# Patient Record
Sex: MALE | Race: WHITE | NOT HISPANIC OR LATINO | Employment: FULL TIME | URBAN - METROPOLITAN AREA
[De-identification: names, ages, dates, MRNs, and addresses within clinical notes are randomized per-mention and may not be internally consistent; named-entity substitution may affect disease eponyms.]

---

## 2022-01-06 ENCOUNTER — OFFICE VISIT (OUTPATIENT)
Dept: FAMILY MEDICINE | Facility: CLINIC | Age: 32
End: 2022-01-06
Payer: COMMERCIAL

## 2022-01-06 VITALS
BODY MASS INDEX: 42.56 KG/M2 | SYSTOLIC BLOOD PRESSURE: 138 MMHG | RESPIRATION RATE: 18 BRPM | WEIGHT: 314.2 LBS | TEMPERATURE: 99.1 F | OXYGEN SATURATION: 96 % | HEART RATE: 110 BPM | DIASTOLIC BLOOD PRESSURE: 89 MMHG | HEIGHT: 72 IN

## 2022-01-06 DIAGNOSIS — Z00.00 HEALTH MAINTENANCE EXAMINATION: Primary | ICD-10-CM

## 2022-01-06 DIAGNOSIS — E66.01 MORBID OBESITY (H): ICD-10-CM

## 2022-01-06 DIAGNOSIS — N50.3 EPIDIDYMAL CYST: ICD-10-CM

## 2022-01-06 DIAGNOSIS — F41.9 ANXIETY: ICD-10-CM

## 2022-01-06 DIAGNOSIS — Z23 NEED FOR PROPHYLACTIC VACCINATION AND INOCULATION AGAINST INFLUENZA: ICD-10-CM

## 2022-01-06 PROCEDURE — 90686 IIV4 VACC NO PRSV 0.5 ML IM: CPT | Performed by: FAMILY MEDICINE

## 2022-01-06 PROCEDURE — 99213 OFFICE O/P EST LOW 20 MIN: CPT | Mod: 25 | Performed by: FAMILY MEDICINE

## 2022-01-06 PROCEDURE — 99385 PREV VISIT NEW AGE 18-39: CPT | Mod: 25 | Performed by: FAMILY MEDICINE

## 2022-01-06 PROCEDURE — 90471 IMMUNIZATION ADMIN: CPT | Performed by: FAMILY MEDICINE

## 2022-01-06 RX ORDER — ESCITALOPRAM OXALATE 10 MG/1
1 TABLET ORAL DAILY
COMMUNITY
Start: 2021-05-04 | End: 2022-01-06

## 2022-01-06 RX ORDER — ESCITALOPRAM OXALATE 10 MG/1
10 TABLET ORAL DAILY
Qty: 90 TABLET | Refills: 3 | Status: SHIPPED | OUTPATIENT
Start: 2022-01-06

## 2022-01-06 RX ORDER — CETIRIZINE HYDROCHLORIDE 10 MG/1
10 TABLET ORAL
COMMUNITY

## 2022-01-06 RX ORDER — MULTIVIT-MIN/FOLIC ACID/BIOTIN 400-400MCG
1 CAPSULE ORAL DAILY
COMMUNITY
End: 2022-07-06

## 2022-01-06 ASSESSMENT — ENCOUNTER SYMPTOMS
HEARTBURN: 0
JOINT SWELLING: 0
CONSTIPATION: 0
DIARRHEA: 0
HEADACHES: 0
COUGH: 0
CHILLS: 0
HEMATURIA: 0
FREQUENCY: 0
ABDOMINAL PAIN: 0
WEAKNESS: 0
SORE THROAT: 0
DIZZINESS: 0
FEVER: 0
EYE PAIN: 0
PALPITATIONS: 0
HEMATOCHEZIA: 0
MYALGIAS: 0
PARESTHESIAS: 0
NERVOUS/ANXIOUS: 0
DYSURIA: 0
NAUSEA: 0
ARTHRALGIAS: 0
SHORTNESS OF BREATH: 0

## 2022-01-06 ASSESSMENT — PAIN SCALES - GENERAL: PAINLEVEL: MODERATE PAIN (5)

## 2022-01-06 ASSESSMENT — MIFFLIN-ST. JEOR: SCORE: 2420.2

## 2022-01-06 NOTE — PROGRESS NOTES
SUBJECTIVE:   CC: Xiang Denson is an 31 year old male who presents for preventative health visit.       Patient has been advised of split billing requirements and indicates understanding: Yes  Healthy Habits:     Getting at least 3 servings of Calcium per day:  Yes    Bi-annual eye exam:  NO    Dental care twice a year:  NO    Sleep apnea or symptoms of sleep apnea:  Excessive snoring    Diet:  Regular (no restrictions)    Frequency of exercise:  2-3 days/week    Duration of exercise:  30-45 minutes    Taking medications regularly:  Yes    Medication side effects:  None    PHQ-2 Total Score: 2    Additional concerns today:  Yes      Today's PHQ-2 Score:   PHQ-2 ( 1999 Pfizer) 1/6/2022   Q1: Little interest or pleasure in doing things 1   Q2: Feeling down, depressed or hopeless 1   PHQ-2 Score 2   Q1: Little interest or pleasure in doing things Several days   Q2: Feeling down, depressed or hopeless Several days   PHQ-2 Score 2       Abuse: Current or Past(Physical, Sexual or Emotional)- No  Do you feel safe in your environment? No    Have you ever done Advance Care Planning? (For example, a Health Directive, POLST, or a discussion with a medical provider or your loved ones about your wishes): Yes, advance care planning is on file.    Social History     Tobacco Use     Smoking status: Not on file     Smokeless tobacco: Not on file   Substance Use Topics     Alcohol use: Not on file         Alcohol Use 1/6/2022   Prescreen: >3 drinks/day or >7 drinks/week? No       Last PSA: No results found for: PSA    Reviewed orders with patient. Reviewed health maintenance and updated orders accordingly - Yes  BP Readings from Last 3 Encounters:   01/06/22 (!) 148/98    Wt Readings from Last 3 Encounters:   01/06/22 142.5 kg (314 lb 3.2 oz)                  Patient Active Problem List   Diagnosis     Morbid obesity (H)     No past surgical history on file.    Social History     Tobacco Use     Smoking status: Former Smoker      "Smokeless tobacco: Former User   Substance Use Topics     Alcohol use: Yes     No family history on file.      Current Outpatient Medications   Medication Sig Dispense Refill     escitalopram (LEXAPRO) 10 MG tablet Take 1 tablet (10 mg) by mouth daily 90 tablet 3     cetirizine (ZYRTEC) 10 MG tablet Take 10 mg by mouth       Specialty Vitamins Products (VITAMINS FOR HAIR) CAPS Take 1 tablet by mouth daily       No Known Allergies  No lab results found.     Reviewed and updated as needed this visit by clinical staff                Reviewed and updated as needed this visit by Provider               No past medical history on file.   No past surgical history on file.    Review of Systems   Constitutional: Negative for chills and fever.   HENT: Negative for congestion, ear pain, hearing loss and sore throat.    Eyes: Negative for pain and visual disturbance.   Respiratory: Negative for cough and shortness of breath.    Cardiovascular: Positive for chest pain. Negative for palpitations and peripheral edema.   Gastrointestinal: Negative for abdominal pain, constipation, diarrhea, heartburn, hematochezia and nausea.   Genitourinary: Negative for dysuria, frequency, genital sores, hematuria, impotence, penile discharge and urgency.   Musculoskeletal: Negative for arthralgias, joint swelling and myalgias.   Skin: Negative for rash.   Neurological: Negative for dizziness, weakness, headaches and paresthesias.   Psychiatric/Behavioral: Negative for mood changes. The patient is not nervous/anxious.          OBJECTIVE:   /89   Pulse 110   Temp 99.1  F (37.3  C) (Tympanic)   Resp 18   Ht 1.824 m (5' 11.81\")   Wt 142.5 kg (314 lb 3.2 oz)   SpO2 96%   BMI 42.84 kg/m      Physical Exam  GENERAL: healthy, alert and no distress  EYES: Eyes grossly normal to inspection, PERRL and conjunctivae and sclerae normal  HENT: ear canals and TM's normal, nose and mouth without ulcers or lesions  NECK: no adenopathy, no asymmetry, " masses, or scars and thyroid normal to palpation  RESP: lungs clear to auscultation - no rales, rhonchi or wheezes  CV: regular rate and rhythm, normal S1 S2, no S3 or S4, no murmur, click or rub, no peripheral edema and peripheral pulses strong  ABDOMEN: soft, nontender, no hepatosplenomegaly, no masses and bowel sounds normal   (male): epididymal enlargement left, no hernias and penis normal without urethral discharge  MS: no gross musculoskeletal defects noted, no edema  SKIN: no suspicious lesions or rashes  NEURO: Normal strength and tone, mentation intact and speech normal  BACK: no CVA tenderness, no paralumbar tenderness  PSYCH: mentation appears normal, affect normal/bright  LYMPH: no cervical, supraclavicular, axillary, or inguinal adenopathy        ASSESSMENT/PLAN:   (Z00.00) Health maintenance examination  (primary encounter diagnosis)  Comment:   Plan: CBC with platelets, Comprehensive metabolic         panel (BMP + Alb, Alk Phos, ALT, AST, Total.         Bili, TP), Lipid panel reflex to direct LDL         Fasting            (Z23) Need for prophylactic vaccination and inoculation against influenza  Comment:   Plan: INFLUENZA VACCINE IM > 6 MONTHS VALENT IIV4         (AFLURIA/FLUZONE)            (E66.01) Morbid obesity (H)  Comment: encouraged him to continue working on life style modification including low fat/carb diet with regular exercise   Plan: mentioned above     (F41.9) Anxiety  Comment: will keep him on the current dose of meds, encouraged him to recheck with us in 1 year for follow up  Plan: escitalopram (LEXAPRO) 10 MG tablet            (N50.3) Epididymal cyst  Comment: palpable mass on left epididymis without particular sx, will have him to schedule US for further evaluation   Plan: US Testicular & Scrotum w [facility]              Patient has been advised of split billing requirements and indicates understanding: Yes  COUNSELING:   Reviewed preventive health counseling, as reflected in  "patient instructions    Estimated body mass index is 42.84 kg/m  as calculated from the following:    Height as of this encounter: 1.824 m (5' 11.81\").    Weight as of this encounter: 142.5 kg (314 lb 3.2 oz).     Weight management plan: Discussed healthy diet and exercise guidelines    He has no history on file for tobacco use.      Counseling Resources:  ATP IV Guidelines  Pooled Cohorts Equation Calculator  FRAX Risk Assessment  ICSI Preventive Guidelines  Dietary Guidelines for Americans, 2010  USDA's MyPlate  ASA Prophylaxis  Lung CA Screening    Aaron Sharpe MD  [facility]  "

## 2022-01-10 ENCOUNTER — HOSPITAL ENCOUNTER (OUTPATIENT)
Dept: ULTRASOUND IMAGING | Facility: CLINIC | Age: 32
Discharge: HOME | End: 2022-01-10
Attending: FAMILY MEDICINE | Admitting: FAMILY MEDICINE
Payer: COMMERCIAL

## 2022-01-10 DIAGNOSIS — N50.3 EPIDIDYMAL CYST: ICD-10-CM

## 2022-01-10 PROCEDURE — 76870 US EXAM SCROTUM: CPT

## 2022-01-20 ENCOUNTER — LAB (OUTPATIENT)
Dept: LAB | Facility: CLINIC | Age: 32
End: 2022-01-20
Payer: COMMERCIAL

## 2022-01-20 DIAGNOSIS — Z00.00 HEALTH MAINTENANCE EXAMINATION: ICD-10-CM

## 2022-01-20 LAB
ALBUMIN SERPL-MCNC: 3.7 G/DL (ref 3.4–5)
ALP SERPL-CCNC: 75 U/L (ref 40–150)
ALT SERPL W P-5'-P-CCNC: 60 U/L (ref 0–70)
ANION GAP SERPL CALCULATED.3IONS-SCNC: 6 MMOL/L (ref 3–14)
AST SERPL W P-5'-P-CCNC: 22 U/L (ref 0–45)
BILIRUB SERPL-MCNC: 0.4 MG/DL (ref 0.2–1.3)
BUN SERPL-MCNC: 13 MG/DL (ref 7–30)
CALCIUM SERPL-MCNC: 9.2 MG/DL (ref 8.5–10.1)
CHLORIDE BLD-SCNC: 106 MMOL/L (ref 94–109)
CHOLEST SERPL-MCNC: 176 MG/DL
CO2 SERPL-SCNC: 27 MMOL/L (ref 20–32)
CREAT SERPL-MCNC: 0.81 MG/DL (ref 0.66–1.25)
ERYTHROCYTE [DISTWIDTH] IN BLOOD BY AUTOMATED COUNT: 13.8 % (ref 10–15)
FASTING STATUS PATIENT QL REPORTED: YES
GFR SERPL CREATININE-BSD FRML MDRD: >90 ML/MIN/1.73M2
GLUCOSE BLD-MCNC: 106 MG/DL (ref 70–99)
HCT VFR BLD AUTO: 45.1 % (ref 40–53)
HDLC SERPL-MCNC: 34 MG/DL
HGB BLD-MCNC: 14.7 G/DL (ref 13.3–17.7)
LDLC SERPL CALC-MCNC: 118 MG/DL
MCH RBC QN AUTO: 30 PG (ref 26.5–33)
MCHC RBC AUTO-ENTMCNC: 32.6 G/DL (ref 31.5–36.5)
MCV RBC AUTO: 92 FL (ref 78–100)
NONHDLC SERPL-MCNC: 142 MG/DL
PLATELET # BLD AUTO: 341 10E3/UL (ref 150–450)
POTASSIUM BLD-SCNC: 4.2 MMOL/L (ref 3.4–5.3)
PROT SERPL-MCNC: 8.3 G/DL (ref 6.8–8.8)
RBC # BLD AUTO: 4.9 10E6/UL (ref 4.4–5.9)
SODIUM SERPL-SCNC: 139 MMOL/L (ref 133–144)
TRIGL SERPL-MCNC: 119 MG/DL
WBC # BLD AUTO: 6.9 10E3/UL (ref 4–11)

## 2022-01-20 PROCEDURE — 80061 LIPID PANEL: CPT

## 2022-01-20 PROCEDURE — 36415 COLL VENOUS BLD VENIPUNCTURE: CPT

## 2022-01-20 PROCEDURE — 80053 COMPREHEN METABOLIC PANEL: CPT

## 2022-01-20 PROCEDURE — 85027 COMPLETE CBC AUTOMATED: CPT

## 2022-06-30 ENCOUNTER — NURSE TRIAGE (OUTPATIENT)
Dept: NURSING | Facility: CLINIC | Age: 32
End: 2022-06-30

## 2022-06-30 ENCOUNTER — OFFICE VISIT (OUTPATIENT)
Dept: FAMILY MEDICINE | Facility: CLINIC | Age: 32
End: 2022-06-30
Payer: COMMERCIAL

## 2022-06-30 VITALS
BODY MASS INDEX: 41.86 KG/M2 | SYSTOLIC BLOOD PRESSURE: 125 MMHG | TEMPERATURE: 97.9 F | RESPIRATION RATE: 18 BRPM | HEART RATE: 95 BPM | WEIGHT: 307 LBS | OXYGEN SATURATION: 96 % | DIASTOLIC BLOOD PRESSURE: 86 MMHG

## 2022-06-30 DIAGNOSIS — J02.9 SORE THROAT: Primary | ICD-10-CM

## 2022-06-30 LAB
DEPRECATED S PYO AG THROAT QL EIA: NEGATIVE
GROUP A STREP BY PCR: NOT DETECTED

## 2022-06-30 PROCEDURE — 99213 OFFICE O/P EST LOW 20 MIN: CPT | Performed by: FAMILY MEDICINE

## 2022-06-30 PROCEDURE — 87651 STREP A DNA AMP PROBE: CPT | Performed by: FAMILY MEDICINE

## 2022-06-30 PROCEDURE — U0005 INFEC AGEN DETEC AMPLI PROBE: HCPCS | Performed by: FAMILY MEDICINE

## 2022-06-30 PROCEDURE — U0003 INFECTIOUS AGENT DETECTION BY NUCLEIC ACID (DNA OR RNA); SEVERE ACUTE RESPIRATORY SYNDROME CORONAVIRUS 2 (SARS-COV-2) (CORONAVIRUS DISEASE [COVID-19]), AMPLIFIED PROBE TECHNIQUE, MAKING USE OF HIGH THROUGHPUT TECHNOLOGIES AS DESCRIBED BY CMS-2020-01-R: HCPCS | Performed by: FAMILY MEDICINE

## 2022-06-30 RX ORDER — DEXAMETHASONE 2 MG/1
10 TABLET ORAL ONCE
Qty: 5 TABLET | Refills: 0 | Status: SHIPPED | OUTPATIENT
Start: 2022-06-30 | End: 2022-06-30

## 2022-06-30 ASSESSMENT — ENCOUNTER SYMPTOMS
SORE THROAT: 1
FATIGUE: 1
HEADACHES: 1

## 2022-06-30 NOTE — LETTER
June 30, 2022      Xiang Denson  [address] [place] [id number]        To Whom It May Concern:    Xiang Denson was seen on 6/30/2022.  Please excuse him from work due to illness today and tomorrow.        Sincerely,        Electronically signed:  Giovany Lucia MD on 6/30/2022 at 3:42 PM

## 2022-06-30 NOTE — PROGRESS NOTES
"  Assessment & Plan     Sore throat  Probably viral.  Discussed symptomatic cares.  NSAIDs have not helped.  We discussed a single dose of Decadron to potentially help with the soreness of the throat.  Should check for COVID as well.  That test still pending. Symptomatic therapy suggested: push fluids and maintain hydration, rest, and return office visit prn if symptoms persist or worsen. Lack of antibiotic effectiveness for viral illnesses discussed with patient. Call or return to clinic prn if these symptoms worsen or fail to improve as anticipated.   - dexamethasone (DECADRON) 2 MG tablet  Dispense: 5 tablet; Refill: 0    BMI:   Estimated body mass index is 41.86 kg/m  as calculated from the following:    Height as of 1/6/22: 1.824 m (5' 11.81\").    Weight as of this encounter: 139.3 kg (307 lb).       Return in about 3 months (around 9/30/2022) for Routine preventive.    Giovany Lucia MD  [facility]    Lonnie Otoole is a 32 year old accompanied by his self., presenting for the following health issues:  Pharyngitis (X 3 weeks/), Headache (/), and Fatigue      Pharyngitis   Associated symptoms include headaches.   Headache     Fatigue  Associated symptoms include fatigue, headaches and a sore throat.   History of Present Illness       Reason for visit:  Lost my voice 3 weeks and had sore throat on right side since with cough and headache s. Also have extreme exhaustion and nasal drainage.  Symptom onset:  3-4 weeks ago  Symptoms include:  Sore throat on right side, cough, headache, nasal drainage, extreme exhaustion.  Symptom intensity:  Moderate  Symptom progression:  Staying the same  Had these symptoms before:  No  What makes it worse:  No  What makes it better:  Cough drops, cold and flu medicine    He eats 2-3 servings of fruits and vegetables daily.He consumes 1 sweetened beverage(s) daily.He exercises with enough effort to increase his heart rate 30 to 60 minutes per day.  " He exercises with enough effort to increase his heart rate 6 days per week.   He is taking medications regularly.       3 weeks symptoms since he traveled to [place] for work.  Minimal cough.  Not productive.  No dyspnea.  No rash.  Using some salt water wash to his naris.  Has Flonase and allergy medicines as well.  Just moved to he would go.  11-month-old daughter    Review of Systems   Constitutional: Positive for fatigue.   HENT: Positive for sore throat.    Neurological: Positive for headaches.            Objective    /86 (BP Location: Left arm, Patient Position: Sitting, Cuff Size: Adult Large)   Pulse 95   Temp 97.9  F (36.6  C) (Temporal)   Resp 18   Wt 139.3 kg (307 lb)   SpO2 96%   BMI 41.86 kg/m    Body mass index is 41.86 kg/m .  Physical Exam   GENERAL: alert, not distressed  EYES: PERRL/EOMI, no scleral icterus, no conjunctival injection  EARS: normal tympanic membranes and external auditory canals bilaterally  PHARYNX: no erythema or exudates  MOUTH: well hydrated mucosa, no lesions  NECK: no lymphadenopathy or thyroid nodules   CHEST: clear, no rales, rhonchi, or wheezes  CARDIAC: regular without murmur, gallop, or rub  ABDOMEN: soft, non tender, non distended, normal bowel sounds        Results for orders placed or performed in visit on 06/30/22   Streptococcus A Rapid Screen w/Reflex to PCR     Status: Normal    Specimen: Throat; Swab   Result Value Ref Range    Group A Strep antigen Negative Negative   Group A Streptococcus PCR Throat Swab     Status: Normal    Specimen: Throat; Swab   Result Value Ref Range    Group A strep by PCR Not Detected Not Detected    Narrative    The Xpert Xpress Strep A test, performed on the [employer], is a rapid, qualitative in vitro diagnostic test for the detection of Streptococcus pyogenes (Group A ß-hemolytic Streptococcus, Strep A) in throat swab specimens from patients with signs and symptoms of pharyngitis. The Xpert Xpress Strep  A test can be used as an aid in the diagnosis of Group A Streptococcal pharyngitis. The assay is not intended to monitor treatment for Group A Streptococcus infections. The Xpert Xpress Strep A test utilizes an automated real-time polymerase chain reaction (PCR) to detect Streptococcus pyogenes DNA.

## 2022-06-30 NOTE — TELEPHONE ENCOUNTER
Pt calling with concerns about;    Home tested neg covid on 6/29    Lost his voice 3 weeks ago  Right side of throat has been sore ever since  Feels somewhat swollen, difficult to swallow at times  Tenderness on right side of his neck under jaw   Nasal drainage  Nagging cough  Extreme fatigue  Intermittent headache  Has been home from work x 2 days- needs  Note for work  Right side of throat looks red    Pt Denies;  Fever  Rash  White spots in back of throat  Difficulty breathing/shortness of breath    According to the protocol, patient should be able to care for this at home.  Care advice given. Patient verbalizes understanding and agrees with plan of care. Transferred to scheduling to make appt at clinic nearby his recent home address- to establish care.    Ramila Johnson RN, Nurse Advisor 2:17 PM 6/30/2022  COVID 19 Nurse Triage Plan/Patient Instructions    Please be aware that novel coronavirus (COVID-19) may be circulating in the community. If you develop symptoms such as fever, cough, or SOB or if you have concerns about the presence of another infection including coronavirus (COVID-19), please contact your health care provider or visit [email or web address].     Disposition/Instructions    Home care recommended. Follow home care protocol based instructions.    Thank you for taking steps to prevent the spread of this virus.  o Limit your contact with others.  o Wear a simple mask to cover your cough.  o Wash your hands well and often.    Resources    [facility]: About COVID-19: [email or web address]    CDC: What to Do If You're Sick: www.cdc.gov/coronavirus/2019-ncov/about/steps-when-sick.html    CDC: Ending Home Isolation: www.cdc.gov/coronavirus/2019-ncov/hcp/disposition-in-home-patients.html     CDC: Caring for Someone: www.cdc.gov/coronavirus/2019-ncov/if-you-are-sick/care-for-someone.html     [facility]: Interim Guidance for Hospital Discharge to Home:  www.health.[facility].mn.us/diseases/coronavirus/hcp/hospdischarge.pdf    [facility] clinical trials (COVID-19 research studies): [email or web address].[facility]/umn-clinical-trials     Below are the COVID-19 hotlines at the [facility] of Health ([facility]). Interpreters are available.   o For health questions: Call 505-870-4394 or 1-952.278.2743 (7 a.m. to 7 p.m.)  o For questions about schools and childcare: Call 701-142-4215 or 1-753.966.5201 (7 a.m. to 7 p.m.)                       Reason for Disposition    Sore throat    Additional Information    Negative: Throat culture results, call about    Negative: Productive cough is the main symptom    Negative: Runny nose is the main symptom    Negative: Drooling or spitting out saliva (because can't swallow)    Negative: Unable to open mouth completely    Negative: Drinking very little and has signs of dehydration (e.g., no urine > 12 hours, very dry mouth, very lightheaded)    Negative: Patient sounds very sick or weak to the triager    Negative: Difficulty breathing (per caller) but not severe    Negative: Fever > 103 F (39.4 C)    Negative: Refuses to drink anything for > 12 hours    Negative: SEVERE sore throat pain    Negative: Pus on tonsils (back of throat) and swollen neck lymph nodes ('glands')    Negative: Earache also present    Negative: Widespread rash (especially chest and abdomen)    Negative: Diabetes mellitus or weak immune system (e.g., HIV positive, cancer chemo, splenectomy, organ transplant, chronic steroids)    Negative: History of rheumatic fever    Negative: Patient wants to be seen    Negative: Fever present > 3 days (72 hours)    Negative: Patient requesting a strep throat test    Negative: Strep exposure within last 10 days    Negative: Sore throat is the main symptom and persists > 48 hours    Negative: Sore throat with cough/cold symptoms present > 5 days    Protocols used: SORE THROAT-A-OH

## 2022-07-01 LAB — SARS-COV-2 RNA RESP QL NAA+PROBE: NEGATIVE

## 2022-07-06 ENCOUNTER — OFFICE VISIT (OUTPATIENT)
Dept: URGENT CARE | Facility: URGENT CARE | Age: 32
End: 2022-07-06
Payer: COMMERCIAL

## 2022-07-06 VITALS
TEMPERATURE: 98.6 F | BODY MASS INDEX: 41.67 KG/M2 | WEIGHT: 305.6 LBS | OXYGEN SATURATION: 97 % | DIASTOLIC BLOOD PRESSURE: 92 MMHG | RESPIRATION RATE: 24 BRPM | HEART RATE: 104 BPM | SYSTOLIC BLOOD PRESSURE: 134 MMHG

## 2022-07-06 DIAGNOSIS — R05.9 COUGH: ICD-10-CM

## 2022-07-06 DIAGNOSIS — J20.9 ACUTE BRONCHITIS, UNSPECIFIED ORGANISM: Primary | ICD-10-CM

## 2022-07-06 LAB
FLUAV AG SPEC QL IA: NEGATIVE
FLUBV AG SPEC QL IA: NEGATIVE

## 2022-07-06 PROCEDURE — 99214 OFFICE O/P EST MOD 30 MIN: CPT | Performed by: PHYSICIAN ASSISTANT

## 2022-07-06 PROCEDURE — 87804 INFLUENZA ASSAY W/OPTIC: CPT | Performed by: PHYSICIAN ASSISTANT

## 2022-07-06 RX ORDER — PREDNISONE 20 MG/1
40 TABLET ORAL DAILY
Qty: 10 TABLET | Refills: 0 | Status: SHIPPED | OUTPATIENT
Start: 2022-07-06 | End: 2022-07-11

## 2022-07-06 RX ORDER — BENZONATATE 100 MG/1
100 CAPSULE ORAL 3 TIMES DAILY PRN
Qty: 21 CAPSULE | Refills: 0 | Status: SHIPPED | OUTPATIENT
Start: 2022-07-06

## 2022-07-06 RX ORDER — ALBUTEROL SULFATE 90 UG/1
2 AEROSOL, METERED RESPIRATORY (INHALATION) EVERY 6 HOURS
Qty: 18 G | Refills: 0 | Status: SHIPPED | OUTPATIENT
Start: 2022-07-06

## 2022-07-06 RX ORDER — MULTIPLE VITAMINS W/ MINERALS TAB 9MG-400MCG
1 TAB ORAL DAILY
COMMUNITY

## 2022-07-06 ASSESSMENT — ENCOUNTER SYMPTOMS
COUGH: 1
CHILLS: 0
DIARRHEA: 1
SORE THROAT: 1
VOMITING: 1
FEVER: 1

## 2022-07-06 NOTE — PATIENT INSTRUCTIONS
There were no signs of pneumonia.    Your symptoms are most likely due to acute bronchitis.  This is inflammation of the tubes leading into the lungs, most often due to a viral infection and an antibiotic will not help this.    I have prescribed an albuterol inhaler to help with the cough/wheezing.    Take Prednisone in the morning and with food.     May take Tessalon Perles as needed for cough.  May also try to use Mucinex or Robitussin.    Please monitor symptoms carefully.  If developing chest pain, shortness of breath, fever, coughing up blood, extreme fatigue, or any other new, concerning symptoms, come back to clinic or go to ER immediately.  Otherwise, if no improvement in symptoms in one week, follow-up with your primary care provider.

## 2022-07-06 NOTE — LETTER
July 6, 2022      Xiang Denson  [address] [place] [id number]        To Whom It May Concern:    Xiang Denson  was seen on 7/6/22.  Please excuse him until symptoms improve. Expected time away is 1-5 days.       Sincerely,        Diane Hood PA-C

## 2022-07-06 NOTE — PROGRESS NOTES
Patient presents with:  Urgent Care: Pt did have strep culture on 6/29/2022. - Pt states he has had diarrhea.  Cough: Pt. HAS PERFORMED 3 COVID TESTS ALL NEGATIVE SINCE LAST Wednesday, 6/29/2022. Pt states the cough has been since last Wednesday, June 29, 2022. - coughing so hard to point of puking. Low grade fever, , unsure if accurate. Pt. Denies cold sweat, chills.  Fever      Clinical Decision Making: Patient experiencing ongoing cough symptoms for the past 1 week.  He has had negative strep, influenza, and COVID testing done.  Chest x-ray is clear today.  Suspect viral bronchitis.  Patient treated with prednisone, albuterol, Tessalon Perles.  Note given for work excusing his absences.      ICD-10-CM    1. Acute bronchitis, unspecified organism  J20.9 albuterol (PROAIR HFA/PROVENTIL HFA/VENTOLIN HFA) 108 (90 Base) MCG/ACT inhaler     benzonatate (TESSALON) 100 MG capsule     predniSONE (DELTASONE) 20 MG tablet   2. Cough  R05.9 Influenza A & B Antigen - Clinic Collect     XR Chest 2 Views       Patient Instructions   There were no signs of pneumonia.    Your symptoms are most likely due to acute bronchitis.  This is inflammation of the tubes leading into the lungs, most often due to a viral infection and an antibiotic will not help this.    I have prescribed an albuterol inhaler to help with the cough/wheezing.    Take Prednisone in the morning and with food.     May take Tessalon Perles as needed for cough.  May also try to use Mucinex or Robitussin.    Please monitor symptoms carefully.  If developing chest pain, shortness of breath, fever, coughing up blood, extreme fatigue, or any other new, concerning symptoms, come back to clinic or go to ER immediately.  Otherwise, if no improvement in symptoms in one week, follow-up with your primary care provider.        HPI:  Xiang Denson is a 32 year old male who presents today complaining of cough for the past 1 week.  Patient has already had 3 negative  COVID tests and negative strep test. His cough is been severe enough to cause vomiting.  He had started out experiencing diarrhea, but his stool has become more formed.  Over the past 24 hours he has been starting to experience low-grade fevers about 100 along with chills and sweats.  He denies any past medical history of lung disease such as asthma.    History obtained from the patient.    Problem List:  2022-01: Morbid obesity (H)      No past medical history on file.    Social History     Tobacco Use     Smoking status: Former Smoker     Smokeless tobacco: Former User   Substance Use Topics     Alcohol use: Yes       Review of Systems   Constitutional: Positive for fever. Negative for chills.   HENT: Positive for sore throat.    Respiratory: Positive for cough.    Gastrointestinal: Positive for diarrhea and vomiting (secondary to cough).       Vitals:    07/06/22 1211   BP: (!) 134/92   BP Location: Right arm   Patient Position: Sitting   Cuff Size: Adult Large   Pulse: 104   Resp: 24   Temp: 98.6  F (37  C)   TempSrc: Tympanic   SpO2: 97%   Weight: 138.6 kg (305 lb 9.6 oz)       Physical Exam  Vitals and nursing note reviewed.   Constitutional:       General: He is not in acute distress.     Appearance: He is not toxic-appearing or diaphoretic.   HENT:      Head: Normocephalic and atraumatic.      Right Ear: External ear normal.      Left Ear: External ear normal.   Eyes:      Conjunctiva/sclera: Conjunctivae normal.   Cardiovascular:      Rate and Rhythm: Normal rate and regular rhythm.      Heart sounds: No murmur heard.  Pulmonary:      Effort: Pulmonary effort is normal. No respiratory distress.      Breath sounds: No stridor. Rhonchi (right lung fields) present. No wheezing or rales.   Chest:      Chest wall: No tenderness.   Neurological:      Mental Status: He is alert.   Psychiatric:         Mood and Affect: Mood normal.         Behavior: Behavior normal.         Thought Content: Thought content normal.          Judgment: Judgment normal.         Results:  Results for orders placed or performed in visit on 07/06/22   XR Chest 2 Views     Status: None    Narrative    CHEST TWO VIEWS July 6, 2022 12:53 PM     HISTORY: Cough for one week. Fever for one day. Coarse lung sounds on  right side. Cough.    COMPARISON: None available.      Impression    IMPRESSION: PA and lateral views of the chest were obtained.  Cardiomediastinal silhouette is within normal limits. No suspicious  focal pulmonary opacities. No significant pleural effusion or  pneumothorax.    MARGUERITE SHEPHERD MD         SYSTEM ID:  [id number]   Results for orders placed or performed in visit on 07/06/22   Influenza A & B Antigen - Clinic Collect     Status: Normal    Specimen: Nose; Swab   Result Value Ref Range    Influenza A antigen Negative Negative    Influenza B antigen Negative Negative    Narrative    Test results must be correlated with clinical data. If necessary, results should be confirmed by a molecular assay or viral culture.         At the end of the encounter, I discussed results, diagnosis, medications. Discussed red flags for immediate return to clinic/ER, as well as indications for follow up if no improvement. Patient understood and agreed to plan. Patient was stable for discharge.

## 2022-07-13 ENCOUNTER — NURSE TRIAGE (OUTPATIENT)
Dept: NURSING | Facility: CLINIC | Age: 32
End: 2022-07-13

## 2022-07-13 NOTE — TELEPHONE ENCOUNTER
Triage Call:     Pt calling to report that he does not feel like his cough is getting better with his current treatment plan and is wanting to make a follow up appt with a provider.     Pt reports that she was seen in the [facility] on 7/6/2022 and was prescribed albuterol inhaler, tessalon, and prednisone.     Pt reports that he has coughing spells that make him vomit from coughing so hard. Reporting this twice in the past 1-2 days.     No fever  96-97% o2 on RA.   No shortness of breath    Pt is using the Albuterol inlater 1 puff every 2 hours although the order is for 2 puffs every 6 hours.     Disposition: See in office today. Pt was given the care advice. Pt was transferred to scheduling and if no appts, is aware of the nearest [facility].     Frances Hughes RN  [facility] Nurse Advisor 9:47 AM 7/13/2022                            Reason for Disposition    SEVERE coughing spells (e.g., whooping sound after coughing, vomiting after coughing)    Additional Information    Negative: Severe difficulty breathing (e.g., struggling for each breath, speaks in single words)    Negative: Rapid onset of cough and has hives    Negative: Bluish (or gray) lips or face    Negative: Coughing started suddenly after medicine, an allergic food or bee sting    Negative: Difficulty breathing after exposure to flames, smoke, or fumes    Negative: Sounds like a life-threatening emergency to the triager    Negative: Previous asthma attacks and this feels like asthma attack    Negative: Chest pain present when not coughing    Negative: Difficulty breathing    Negative: Passed out (i.e., fainted, collapsed and was not responding)    Negative: Patient sounds very sick or weak to the triager    Negative: Coughed up > 1 tablespoon (15 ml) blood (Exception: blood-tinged sputum)    Negative: Fever > 103 F (39.4 C)    Negative: Fever > 101 F (38.3 C) and over 60 years of age    Negative: Fever > 100.0 F (37.8 C) and has diabetes mellitus or a weak immune  system (e.g., HIV positive, cancer chemotherapy, organ transplant, splenectomy, chronic steroids)    Negative: Fever > 100.0 F (37.8 C) and bedridden (e.g., nursing home patient, stroke, chronic illness, recovering from surgery)    Negative: Increasing ankle swelling    Negative: Wheezing is present    Negative: Coughing up elijah-colored (reddish-brown) or blood-tinged sputum    Negative: Fever present > 3 days (72 hours)    Negative: Fever returns after gone for over 24 hours and symptoms worse or not improved    Negative: Using nasal washes and pain medicine > 24 hours and sinus pain persists    Negative: Known COPD or other severe lung disease (i.e., bronchiectasis, cystic fibrosis, lung surgery) and worsening symptoms (i.e., increased sputum purulence or amount, increased breathing difficulty)    Negative: Cough has been present for > 3 weeks    Negative: Allergy symptoms are also present (e.g., itchy eyes, clear nasal discharge, postnasal drip)    Negative: Nasal discharge present > 10 days    Protocols used: COUGH-A-OH

## 2022-07-14 ENCOUNTER — OFFICE VISIT (OUTPATIENT)
Dept: FAMILY MEDICINE | Facility: CLINIC | Age: 32
End: 2022-07-14
Payer: COMMERCIAL

## 2022-07-14 VITALS
TEMPERATURE: 98.2 F | HEART RATE: 100 BPM | OXYGEN SATURATION: 98 % | SYSTOLIC BLOOD PRESSURE: 130 MMHG | RESPIRATION RATE: 20 BRPM | DIASTOLIC BLOOD PRESSURE: 84 MMHG

## 2022-07-14 DIAGNOSIS — R05.3 PERSISTENT COUGH FOR 3 WEEKS OR LONGER: Primary | ICD-10-CM

## 2022-07-14 LAB
B PARAPERT DNA SPEC QL NAA+PROBE: NOT DETECTED
B PERT DNA SPEC QL NAA+PROBE: NOT DETECTED
SARS-COV-2 RNA RESP QL NAA+PROBE: NEGATIVE

## 2022-07-14 PROCEDURE — U0003 INFECTIOUS AGENT DETECTION BY NUCLEIC ACID (DNA OR RNA); SEVERE ACUTE RESPIRATORY SYNDROME CORONAVIRUS 2 (SARS-COV-2) (CORONAVIRUS DISEASE [COVID-19]), AMPLIFIED PROBE TECHNIQUE, MAKING USE OF HIGH THROUGHPUT TECHNOLOGIES AS DESCRIBED BY CMS-2020-01-R: HCPCS | Performed by: NURSE PRACTITIONER

## 2022-07-14 PROCEDURE — U0005 INFEC AGEN DETEC AMPLI PROBE: HCPCS | Performed by: NURSE PRACTITIONER

## 2022-07-14 PROCEDURE — 99213 OFFICE O/P EST LOW 20 MIN: CPT | Mod: CS | Performed by: NURSE PRACTITIONER

## 2022-07-14 PROCEDURE — 87798 DETECT AGENT NOS DNA AMP: CPT | Performed by: NURSE PRACTITIONER

## 2022-07-14 RX ORDER — DOXYCYCLINE 100 MG/1
100 CAPSULE ORAL 2 TIMES DAILY
Qty: 14 CAPSULE | Refills: 0 | Status: SHIPPED | OUTPATIENT
Start: 2022-07-14 | End: 2022-07-21

## 2022-07-14 ASSESSMENT — ENCOUNTER SYMPTOMS
SINUS PAIN: 0
VOMITING: 1
SINUS PRESSURE: 0
FEVER: 0
FATIGUE: 1

## 2022-07-14 NOTE — PROGRESS NOTES
Assessment & Plan     Persistent cough for 3 weeks or longer    - B. pertussis/parapertussis PCR-NP  - Symptomatic; Yes; 6/30/2022 COVID-19 Virus (Coronavirus) by PCR  - doxycycline hyclate (VIBRAMYCIN) 100 MG capsule  Dispense: 14 capsule; Refill: 0       Focused exam and history done due to COVID-19 pandemic in a walk-in setting.      History, exam, and vital signs consistent with a viral URI.  Third visit for same.  Lungs are clear today.  Negative PCR COVID, chest x-ray, influenza, strep thus far.  Has not worsened since visit on July 6.  He is not improving after about 3 weeks of illness.  Recheck COVID, pertussis.  Does have some purulent postnasal drainage.  Can try doxycycline.  Does appear pretty tired with frequent cough.  Okay to return to work from a contagiousness perspective.     Recheck if shortness of breath or new fevers develop.  Rest.  DayQuil NyQuil recommended.  Sleep a little propped up for drainage.    OTCs recommended: None [   ].  Dextromethorphan  [ x ], guaifenesin [x  ], pseudoephedrine [   ], Afrin for no greater than 3 days [  ], Tylenol or ibuprofen [  ].                Return in about 4 days (around 7/18/2022).  If not better.    Erica Ramsey [facility]    Lonnie Otoole is a 32 year old male who presents to clinic today for the following health issues:  Chief Complaint   Patient presents with     Cough     Bronchitis x 1 week ago; not getting better-- has stayed the same. Coughing hard that he will vomit.  Needs a return back to work letter     HPI    Patient with third visit associated with sore throat, cough.  Has had negative chest x-ray, COVID, home COVID, influenza testing so far.  No fevers.  Last visit on the sixth, he was treated with prednisone albuterol and Tessalon.  Was fatigued, achy when it started.      Emesis associated with coughing every 2 days, headaches from coughing.     No fevers.      Slight shortness of breath.       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     No asthma.  Fmr smoker.      Taking allergy medicine.      +PND - clear and yellow, thick.     Coughing up clear and yellow, thick.      Inhaler helps a bit.      Has not improved since visit on the sixth.  Has been very tired related to coughing.  Has been missing a lot of work.      Review of Systems   Constitutional: Positive for fatigue. Negative for fever.   HENT: Positive for postnasal drip. Negative for sinus pressure, sinus pain and sneezing.    Gastrointestinal: Positive for vomiting (with cough ).   Allergic/Immunologic: Positive for environmental allergies.           Objective    /84 (BP Location: Right arm, Patient Position: Chair, Cuff Size: Adult Regular)   Pulse 100   Temp 98.2  F (36.8  C) (Tympanic)   Resp 20   SpO2 98%   Physical Exam  Constitutional:       Appearance: He is well-developed.   HENT:      Right Ear: External ear normal.      Left Ear: External ear normal.      Nose: No congestion or rhinorrhea.      Right Sinus: No maxillary sinus tenderness or frontal sinus tenderness.      Left Sinus: No maxillary sinus tenderness or frontal sinus tenderness.      Mouth/Throat:      Pharynx: No posterior oropharyngeal erythema.   Eyes:      General:         Right eye: No discharge.         Left eye: No discharge.      Conjunctiva/sclera: Conjunctivae normal.   Cardiovascular:      Rate and Rhythm: Normal rate and regular rhythm.      Pulses: Normal pulses.      Heart sounds: Normal heart sounds.   Pulmonary:      Effort: Pulmonary effort is normal.      Breath sounds: Normal breath sounds. No wheezing.      Comments: Frequent dry cough noted during exam.  Musculoskeletal:         General: Normal range of motion.   Skin:     General: Skin is warm.   Neurological:      Mental Status: He is alert and oriented to person, place, and time.   Psychiatric:         Behavior: Behavior normal.         Thought Content: Thought content normal.          Judgment: Judgment normal.

## 2022-07-14 NOTE — LETTER
[address] [address]  Phone: [phone]  Fax: [phone]    July 14, 2022        Xiang Denson  [address] [facility] [address]          To whom it may concern:    RE: Xiang Denson    Patient was seen and treated today at our clinic. He can return to work tomorrow.         Please contact me for questions or concerns.      Sincerely,        Eirca Ramsey, CNP

## 2022-07-14 NOTE — PATIENT INSTRUCTIONS
Hold vitamins while on doxycycline - for purulent drainage     4-5 days recheck if not better     Screening for whooping cough and recheck covid     Probiotics - yogurt, cuturelle options     Ok to work     Continue cough medicine - dayquil/nyquil

## 2022-07-15 ENCOUNTER — TELEPHONE (OUTPATIENT)
Dept: URGENT CARE | Facility: URGENT CARE | Age: 32
End: 2022-07-15

## 2022-07-15 NOTE — TELEPHONE ENCOUNTER
Reason for Call:  Form, our goal is to have forms completed with 72 hours, however, some forms may require a visit or additional information.    Type of letter, form or note:  employer    Who is the form from?: Insurance comp    Where did the form come from: form was faxed in    What clinic location was the form placed at?: [place]    Where the form was placed[facility] [address]    What number is listed as a contact on the form?:[phone]       Additional comments: n/a    Call taken on 7/15/2022 at 9:49 AM by Higinio Craig

## 2022-07-26 NOTE — TELEPHONE ENCOUNTER
Unfortunately, [facility] can not fill out FMLA/Disablilty forms due to not having a set provider schedule. Form needs to be forwarded to provider he saw most recently.

## 2022-10-03 ENCOUNTER — HEALTH MAINTENANCE LETTER (OUTPATIENT)
Age: 32
End: 2022-10-03

## 2023-02-12 ENCOUNTER — HEALTH MAINTENANCE LETTER (OUTPATIENT)
Age: 33
End: 2023-02-12

## 2024-03-09 ENCOUNTER — HEALTH MAINTENANCE LETTER (OUTPATIENT)
Age: 34
End: 2024-03-09

## 2025-03-16 ENCOUNTER — HEALTH MAINTENANCE LETTER (OUTPATIENT)
Age: 35
End: 2025-03-16